# Patient Record
Sex: MALE | Race: BLACK OR AFRICAN AMERICAN | Employment: FULL TIME | URBAN - METROPOLITAN AREA
[De-identification: names, ages, dates, MRNs, and addresses within clinical notes are randomized per-mention and may not be internally consistent; named-entity substitution may affect disease eponyms.]

---

## 2021-06-04 ENCOUNTER — HOSPITAL ENCOUNTER (EMERGENCY)
Age: 46
Discharge: HOME | End: 2021-06-04
Payer: COMMERCIAL

## 2021-06-04 ENCOUNTER — APPOINTMENT (OUTPATIENT)
Dept: GENERAL RADIOLOGY | Age: 46
End: 2021-06-04
Payer: COMMERCIAL

## 2021-06-04 VITALS
HEART RATE: 82 BPM | DIASTOLIC BLOOD PRESSURE: 72 MMHG | OXYGEN SATURATION: 98 % | RESPIRATION RATE: 16 BRPM | TEMPERATURE: 98.7 F | SYSTOLIC BLOOD PRESSURE: 121 MMHG

## 2021-06-04 DIAGNOSIS — M25.559 ACUTE HIP PAIN, UNSPECIFIED LATERALITY: Primary | ICD-10-CM

## 2021-06-04 PROCEDURE — 72100 X-RAY EXAM L-S SPINE 2/3 VWS: CPT

## 2021-06-04 PROCEDURE — 73502 X-RAY EXAM HIP UNI 2-3 VIEWS: CPT

## 2021-06-04 PROCEDURE — 6370000000 HC RX 637 (ALT 250 FOR IP): Performed by: NURSE PRACTITIONER

## 2021-06-04 PROCEDURE — 99283 EMERGENCY DEPT VISIT LOW MDM: CPT

## 2021-06-04 RX ORDER — LIDOCAINE 4 G/G
1 PATCH TOPICAL ONCE
Status: DISCONTINUED | OUTPATIENT
Start: 2021-06-04 | End: 2021-06-05 | Stop reason: HOSPADM

## 2021-06-04 RX ORDER — ACETAMINOPHEN 500 MG
1000 TABLET ORAL 4 TIMES DAILY PRN
Qty: 60 TABLET | Refills: 0 | Status: SHIPPED | OUTPATIENT
Start: 2021-06-04

## 2021-06-04 RX ORDER — IBUPROFEN 800 MG/1
800 TABLET ORAL EVERY 8 HOURS PRN
Qty: 20 TABLET | Refills: 0 | Status: SHIPPED | OUTPATIENT
Start: 2021-06-04

## 2021-06-04 RX ORDER — LIDOCAINE 50 MG/G
1 PATCH TOPICAL DAILY
Qty: 10 PATCH | Refills: 0 | Status: SHIPPED | OUTPATIENT
Start: 2021-06-04 | End: 2021-06-14

## 2021-06-04 RX ORDER — CYCLOBENZAPRINE HCL 10 MG
10 TABLET ORAL 3 TIMES DAILY PRN
Qty: 20 TABLET | Refills: 0 | Status: SHIPPED | OUTPATIENT
Start: 2021-06-04 | End: 2021-06-11

## 2021-06-04 RX ORDER — ACETAMINOPHEN 500 MG
1000 TABLET ORAL ONCE
Status: COMPLETED | OUTPATIENT
Start: 2021-06-04 | End: 2021-06-04

## 2021-06-04 RX ADMIN — ACETAMINOPHEN 1000 MG: 500 TABLET ORAL at 21:41

## 2021-06-04 ASSESSMENT — PAIN SCALES - GENERAL
PAINLEVEL_OUTOF10: 8
PAINLEVEL_OUTOF10: 4

## 2021-06-05 NOTE — ED PROVIDER NOTES
[address]  [address] [place] [address]  Dept: [phone]  Loc: [address] ENCOUNTER        This patient was not seen or evaluated by the attending physician. I evaluated this patient, the attending physician was available for consultation. CHIEF COMPLAINT    Chief Complaint   Patient presents with    Hip Pain     L hip pain since wednesday,        HPI    Marck Boland is a 39 y.o. male who presents with pain, localized in the left hip, radiating down the extremity. Onset was 2 days ago. Has been progressively worsening. He states that he has been still continuing to work despite the pain. He works in a warehouse, lifts, pushes and pulls heavy boxes. States that he took a 800 mg ibuprofen and one of his sisters muscle relaxant and his pain is very much improved after this. Currently pain is a 3 out of 10. Denies any numbness or tingling distal to the site of pain. Patient denies history of malignancy, IV drug use, or recent surgery. No history of uncontrolled diabetes, HIV or immunosuppressant therapy. Came to the ED for further evaluation and treatment. REVIEW OF SYSTEMS    General: No fevers, chills or night sweats, No weight loss  GI: No abdominal pain or vomiting  :No dysuria or hematuria  Musculoskeletal: see HPI, No unrelenting pain or night pain  Neurologic: No bowel or bladder incontinence, No saddle anesthesia, No leg weakness  All other systems reviewed and are negative. PAST MEDICAL & SURGICAL HISTORY    History reviewed. No pertinent past medical history. History reviewed. No pertinent surgical history.     CURRENT MEDICATIONS  (may include discharge medications prescribed in the ED)  Current Outpatient Rx   Medication Sig Dispense Refill    acetaminophen (TYLENOL) 500 MG tablet Take 2 tablets by mouth 4 times daily as needed for Pain 60 tablet 0    ibuprofen (IBU) 800 MG tablet Take 1 tablet by mouth every 8 hours as needed for Pain 20 tablet 0    cyclobenzaprine (FLEXERIL) 10 MG tablet Take 1 tablet by mouth 3 times daily as needed for Muscle spasms 20 tablet 0    lidocaine (LIDODERM) 5 % Place 1 patch onto the skin daily for 10 days 12 hours on, 12 hours off. 10 patch 0       ALLERGIES    No Known Allergies    SOCIAL HISTORY    Social History     Socioeconomic History    Marital status: Single     Spouse name: None    Number of children: None    Years of education: None    Highest education level: None   Occupational History    None   Tobacco Use    Smoking status: Current Every Day Smoker     Types: Cigars    Smokeless tobacco: Never Used    Tobacco comment: few cigars daily    Substance and Sexual Activity    Alcohol use: Not Currently    Drug use: Yes     Types: Marijuana    Sexual activity: Yes     Partners: Female   Other Topics Concern    None   Social History Narrative    None     Social Determinants of Health     Financial Resource Strain:     Difficulty of Paying Living Expenses:    Food Insecurity:     Worried About Running Out of Food in the Last Year:     Ran Out of Food in the Last Year:    Transportation Needs:     Lack of Transportation (Medical):      Lack of Transportation (Non-Medical):    Physical Activity:     Days of Exercise per Week:     Minutes of Exercise per Session:    Stress:     Feeling of Stress :    Social Connections:     Frequency of Communication with Friends and Family:     Frequency of Social Gatherings with Friends and Family:     Attends Temple Services:     Active Member of Clubs or Organizations:     Attends Club or Organization Meetings:     Marital Status:    Intimate Partner Violence:     Fear of Current or Ex-Partner:     Emotionally Abused:     Physically Abused:     Sexually Abused:        PHYSICAL EXAM    VITAL SIGNS: /72   Pulse 82   Temp 98.7 °F (37.1 °C)   Resp 16   SpO2 98% Constitutional:  Well developed, well nourished, no acute distress, non-toxic appearing  HENT:  Atraumatic, moist mucus membranes  Neck: No JVD, supple   Respiratory:  No respiratory distress, normal breath sounds  Cardiovascular:  regular rate, no murmurs  GI:  Soft, nontender, no pulsatile masses or bruits of the abdomen  Musculoskeletal:  No edema, no acute deformities moves all 4 extremities spontaneously, full abduction and abduction of the hip. Was able to witness patient ambulating to his room without any difficulties. Some mild SI pain upon palpation only. Back: no bilateral lumbar paraspinous tenderness to palpation, no bony midline tenderness, negative straight leg raise test bilaterally, no CVA tenderness  Integument:  Well hydrated, no rash  Vascular: Dorsalis pedis pulses are 2+ and equal bilaterally  Neurologic:  Motor testing reveals: intact thigh adduction, knee flexion and extension, ankle dorsiflexion, toe plantarflexion, and great toe dorsiflexion bilaterally, sensation to light touch is intact in the groin and lower extremities bilaterally. Witnessed ambulation showed no ataxia, gait abnormalities or issues. RADIOLOGY  XR LUMBAR SPINE (2-3 VIEWS)   Final Result   No evidence of acute fracture or malalignment. XR HIP 2-3 VW W PELVIS LEFT   Final Result   No acute abnormality of the hip. ED COURSE & MEDICAL DECISION MAKING    Please see EMR for medications administered in the ED. Differential Diagnosis: Spinal Epidural Abscess, Vertebral Osteomyelitis, Cauda Equina Syndrome, Spinal Cord Compression, musculoskeletal pain, sciatica, ruptured/dissecting Abdominal Aortic Aneurysm, Metastases to the back, Herpes Zoster, Fracture or dislocation, other    Patient is afebrile and nontoxic in appearance.  No evidence of neurological deficit on exam.  The pain is not a ripping or tearing sensation, they have no neurovascular compromise on examination in bilateral lower extremities. No rashes or lesions appreciated. Plain films as above. Due to the unremarkable history and lack of systemic complaints or atypical features, as well as the absence of neurological deficit, I have low suspicion at this time for epidural compression syndrome or infectious etiology. Patient's pain is most likely musculoskeletal especially since the muscle relaxant and 800 mg ibuprofen helped his pain even prior to arrival. I believe the patient is safe for discharge at this time. I'll prescribe symptomatic medications. No results found for this visit on 06/04/21. I estimate there is LOW risk for ABDOMINAL AORTIC ANEURYSM, CAUDA EQUINA SYNDROME, EPIDURAL MASS LESION, SPINAL STENOSIS, OR HERNIATED DISK CAUSING SEVERE STENOSIS, thus I consider the discharge disposition reasonable. Marck Boland and I have discussed the diagnosis and risks, and we agree with discharging home to follow-up with their primary doctor in 2 to 3 days. We also discussed returning to the Emergency Department immediately if new or worsening symptoms occur. We have discussed the symptoms which are most concerning (e.g., saddle anesthesia, urinary or bowel incontinence or retention, changing or worsening pain) that necessitate immediate return. Patient verbalized understanding, has no further questions or concerns that are in agreement with this plan as well as the plan of discharge. Final Impression    1. Acute hip pain, unspecified laterality        Blood pressure 121/72, pulse 82, temperature 98.7 °F (37.1 °C), resp. rate 16, SpO2 98 %.      PLAN  Discharge with outpatient follow-up (see EMR)      (Please note that this note was completed with a voice recognition program.  Every attempt was made to edit the dictations, but inevitably there remain words that are mis-transcribed.)            Caron Lakhani, DAMIEN - CNP  06/04/21 1623

## 2021-06-05 NOTE — ED TRIAGE NOTES
Pt to Ed with complaints of R hip pain that started 3 days ago. Pt denies injury. States he has been unable to walk on his own. Pt did take a muscle relaxer around 1945 today.

## 2021-06-09 ENCOUNTER — HOSPITAL ENCOUNTER (OUTPATIENT)
Dept: MRI IMAGING | Age: 46
Discharge: HOME | End: 2021-06-09
Payer: COMMERCIAL

## 2021-06-09 ENCOUNTER — TELEPHONE (OUTPATIENT)
Dept: ORTHOPEDIC SURGERY | Age: 46
End: 2021-06-09

## 2021-06-09 ENCOUNTER — OFFICE VISIT (OUTPATIENT)
Dept: ORTHOPEDIC SURGERY | Age: 46
End: 2021-06-09
Payer: COMMERCIAL

## 2021-06-09 VITALS — BODY MASS INDEX: 24.91 KG/M2 | HEIGHT: 66 IN | WEIGHT: 155 LBS

## 2021-06-09 DIAGNOSIS — M25.551 RIGHT HIP PAIN: Primary | ICD-10-CM

## 2021-06-09 DIAGNOSIS — M87.051 AVASCULAR NECROSIS OF HIP, RIGHT (HCC): ICD-10-CM

## 2021-06-09 DIAGNOSIS — M25.551 RIGHT HIP PAIN: ICD-10-CM

## 2021-06-09 PROCEDURE — 99203 OFFICE O/P NEW LOW 30 MIN: CPT | Performed by: ORTHOPAEDIC SURGERY

## 2021-06-09 PROCEDURE — 73721 MRI JNT OF LWR EXTRE W/O DYE: CPT

## 2021-06-09 RX ORDER — METHYLPREDNISOLONE 4 MG/1
TABLET ORAL
Qty: 1 KIT | Refills: 0 | Status: SHIPPED | OUTPATIENT
Start: 2021-06-09

## 2021-06-09 NOTE — TELEPHONE ENCOUNTER
I spoke with patient and told him to call [facility] and schedule his MRI. I also told him to call back and make an appt with Dr Michael Iqbal to go over the results.

## 2021-06-09 NOTE — PROGRESS NOTES
both lower extremities is grossly intact. Exam of both feet reveals pedal pulses intact and brisk cap refill. Patient is able to dorsiflex and wiggle all toes. Deep tendon reflexes of the lower extremities are normal and symmetric. He is non tender to palpation of the lumbar spine. X-rays: AP pelvis and 2 views of the right hip obtained in the office today were extensively reviewed. AP pelvis and 2 views of the left hip obtained in the emergency room were extensively reviewed. There is a subtle flattening of both femoral heads. There is very mild early degenerative disease of both hips. Impression: #1 right hip pain #2 early osteoarthritis right hip/femoral acetabular impingement    Plan: At this time, due to his severe pain we will obtain an MRI scan of the right hip. The patient was given a Medrol Dosepak. We will keep him off work until follow-up. He was encouraged to modify his activities. The patient will follow up with me after the MRI scan is completed.

## 2021-06-09 NOTE — TELEPHONE ENCOUNTER
I called the patient back to verify he received my message. He states he called the office back and was told by the person who answered the phone to [facility] his messages for all the information\". No follow up appointment was made at that time. The patient states he is on his way to [facility] now for his MRI. I did schedule his follow up apt for Friday.

## 2021-06-09 NOTE — LETTER
[facility]  [address] [place] [address] [address] [address]  Phone: [phone]  Fax: [phone]    Shana Collado MD        June 9, 2021     Patient: Caleb Cuevas   YOB: 1975   Date of Visit: 6/9/2021       To Whom It May Concern: It is my medical opinion that Caleb Cuevas should remain out of work until 6-14-21. this includes 6-8-21. If you have any questions or concerns, please don't hesitate to call.     Sincerely,          Shana Collado MD

## 2021-06-11 ENCOUNTER — OFFICE VISIT (OUTPATIENT)
Dept: ORTHOPEDIC SURGERY | Age: 46
End: 2021-06-11
Payer: COMMERCIAL

## 2021-06-11 VITALS — HEIGHT: 66 IN | BODY MASS INDEX: 25.99 KG/M2 | WEIGHT: 161.7 LBS | RESPIRATION RATE: 14 BRPM

## 2021-06-11 DIAGNOSIS — M16.0 PRIMARY OSTEOARTHRITIS OF BOTH HIPS: Primary | ICD-10-CM

## 2021-06-11 PROCEDURE — 99214 OFFICE O/P EST MOD 30 MIN: CPT | Performed by: ORTHOPAEDIC SURGERY

## 2021-06-11 NOTE — PROGRESS NOTES
Annabella Betancur  <[id number]>  June 11, 2021    Chief Complaint   Patient presents with    Hip Pain     R Hip Pain - TR MRI        History: The patient is a 42-year-old gentleman who is here for evaluation of his right hip. His right hip pain has improved significantly. He has been taking the steroids. He is here to review his MRI results. The patient's  past medical history, medications, allergies,  family history, social history, and have been reviewed, and dated and are recorded in the chart. Pertinent items are noted in HPI. Review of systems reviewed from Pertinent History Form dated on 6/9/2021 and available in the patient's chart under the Media tab. Vitals:  Resp 14   Ht 5' 6\" (1.676 m)   Wt 161 lb 11.2 oz (73.3 kg)   BMI 26.10 kg/m²     Physical: Physical: Mr. Annabella Betancur appears well, he is in no apparent distress, he demonstrates appropriate mood & affect. He is alert and oriented to person, place and time. He has moderate pain with internal rotation of the right hip. The patient does have a right hip pain with flexion abduction and external rotation of the right hip. Range of motion of the right hip is : 40 degrees abduction, 30 degrees adduction, 35 degrees of external rotation and 15 degrees of internal rotation. Range of motion of the opposite hip is full. He is non tender laterally about the hips. Trendelenburg test is negative bilaterally. Evan Mooring test is negative bilaterally. He is non tender about the Sacroiliac joint bilaterally. Leg length discrepancy: none. Examination of the skin reveals no rashes, ulcerations, or lesions, bilaterally in the lower extremities. Sensation to both lower extremities is grossly intact. Exam of both feet reveals pedal pulses intact and brisk cap refill. Patient is able to dorsiflex and wiggle all toes. Deep tendon reflexes of the lower extremities are normal and symmetric. He is non tender to palpation of the lumbar spine.     X-rays: MRI of the right hip was extensively reviewed. The MRI does reveal diffuse degenerative fraying of the labrum. There is mild bilateral hip osteoarthritis. There is no evidence of avascular necrosis. There is a mild fluid signal involving the hamstrings bilaterally. Impression: #1 bilateral hip osteoarthritis      Plan: At this time, the patient will go ahead and finish the steroids. He will then resume his ibuprofen. He was encouraged to modify his activities. The patient may return to working in the [employer] on Monday. He will follow-up with me in 4 weeks and we will reassess him then. If he continues to have severe pain, we will consider an intra-articular hip injection.

## 2021-06-21 ENCOUNTER — TELEPHONE (OUTPATIENT)
Dept: ORTHOPEDIC SURGERY | Age: 46
End: 2021-06-21